# Patient Record
Sex: FEMALE | Race: WHITE | NOT HISPANIC OR LATINO | Employment: FULL TIME | URBAN - METROPOLITAN AREA
[De-identification: names, ages, dates, MRNs, and addresses within clinical notes are randomized per-mention and may not be internally consistent; named-entity substitution may affect disease eponyms.]

---

## 2023-04-11 ENCOUNTER — TELEPHONE (OUTPATIENT)
Dept: OBSTETRICS AND GYNECOLOGY | Facility: CLINIC | Age: 38
End: 2023-04-11
Payer: COMMERCIAL

## 2024-02-14 ENCOUNTER — OFFICE VISIT (OUTPATIENT)
Dept: OBSTETRICS AND GYNECOLOGY | Facility: CLINIC | Age: 39
End: 2024-02-14
Payer: COMMERCIAL

## 2024-02-14 VITALS
HEIGHT: 63 IN | WEIGHT: 143 LBS | DIASTOLIC BLOOD PRESSURE: 99 MMHG | SYSTOLIC BLOOD PRESSURE: 154 MMHG | HEART RATE: 88 BPM | BODY MASS INDEX: 25.34 KG/M2

## 2024-02-14 DIAGNOSIS — Z00.00 ENCOUNTER FOR WELLNESS EXAMINATION: ICD-10-CM

## 2024-02-14 DIAGNOSIS — Z01.419 ENCOUNTER FOR WELL WOMAN EXAM: ICD-10-CM

## 2024-02-14 DIAGNOSIS — Z80.3 FAMILY HISTORY OF BREAST CANCER: ICD-10-CM

## 2024-02-14 DIAGNOSIS — Z12.39 ENCOUNTER FOR SCREENING FOR MALIGNANT NEOPLASM OF BREAST, UNSPECIFIED SCREENING MODALITY: Primary | ICD-10-CM

## 2024-02-14 DIAGNOSIS — N39.3 SUI (STRESS URINARY INCONTINENCE, FEMALE): ICD-10-CM

## 2024-02-14 PROCEDURE — 99459 PELVIC EXAMINATION: CPT | Mod: S$GLB,,, | Performed by: OBSTETRICS & GYNECOLOGY

## 2024-02-14 PROCEDURE — 3008F BODY MASS INDEX DOCD: CPT | Mod: CPTII,S$GLB,, | Performed by: OBSTETRICS & GYNECOLOGY

## 2024-02-14 PROCEDURE — 99386 PREV VISIT NEW AGE 40-64: CPT | Mod: S$GLB,,, | Performed by: OBSTETRICS & GYNECOLOGY

## 2024-02-14 PROCEDURE — 1159F MED LIST DOCD IN RCRD: CPT | Mod: CPTII,S$GLB,, | Performed by: OBSTETRICS & GYNECOLOGY

## 2024-02-14 PROCEDURE — 3080F DIAST BP >= 90 MM HG: CPT | Mod: CPTII,S$GLB,, | Performed by: OBSTETRICS & GYNECOLOGY

## 2024-02-14 PROCEDURE — 3077F SYST BP >= 140 MM HG: CPT | Mod: CPTII,S$GLB,, | Performed by: OBSTETRICS & GYNECOLOGY

## 2024-02-14 RX ORDER — RIZATRIPTAN BENZOATE 10 MG/1
TABLET ORAL
COMMUNITY
Start: 2024-01-27

## 2024-02-14 RX ORDER — MELOXICAM 15 MG/1
15 TABLET ORAL DAILY
COMMUNITY

## 2024-02-14 RX ORDER — BUPROPION HYDROCHLORIDE 300 MG/1
300 TABLET ORAL
COMMUNITY
Start: 2024-01-26

## 2024-02-14 NOTE — PROGRESS NOTES
Subjective:      Patient ID: Brian Flores is a 38 y.o. female.    Chief Complaint:  Well Woman and Bladder Incontinence (Pt reports leakage all the time. She is needing to wear a pad daily.)      History of Present Illness  Gynecologic Exam  The patient's pertinent negatives include no genital itching, genital lesions, genital odor, genital rash, missed menses, pelvic pain, vaginal bleeding or vaginal discharge. She is not pregnant. Associated symptoms include headaches. Pertinent negatives include no abdominal pain, anorexia, back pain, chills, constipation, diarrhea, discolored urine, dysuria, fever, flank pain, hematuria, nausea, painful intercourse, rash, urgency or vomiting. The vaginal discharge was normal. The vaginal bleeding is typical of menses. She has not been passing clots. She has not been passing tissue. Nothing aggravates the symptoms. She has tried acetaminophen and NSAIDs for the symptoms. She is sexually active. No, her partner does not have an STD. She uses an IUD for contraception. Her menstrual history has been regular. There is no history of an abdominal surgery, a  section, an ectopic pregnancy, endometriosis, a gynecological surgery, herpes simplex, menorrhagia, metrorrhagia, miscarriage, ovarian cysts, perineal abscess, PID, an STD, a terminated pregnancy or vaginosis.     This is a 38 year old female coming in for her annual exam. She also has complaints of leakage of urine with coughing, sneezing running jumping and laughing.  That when she gets up from the bathroom she continues to have urine leaking out.  She denies any urgency symptoms.    GYN & OB History  Patient's last menstrual period was 2024 (exact date).   Date of Last Pap: No result found    OB History    Para Term  AB Living   3 3 3     3   SAB IAB Ectopic Multiple Live Births           3      # Outcome Date GA Lbr Milton/2nd Weight Sex Delivery Anes PTL Lv   3 Term      Vag-Spont   KEISHA   2 Term  2006     Vag-Spont   KEISHA   1 Term 2000     Vag-Spont   KEISHA       Review of Systems  Review of Systems   Constitutional:  Negative for chills, fatigue, fever and unexpected weight change.   Respiratory:  Negative for cough and shortness of breath.    Cardiovascular:  Negative for chest pain, palpitations and leg swelling.   Gastrointestinal:  Negative for abdominal pain, anorexia, bloating, blood in stool, constipation, diarrhea, nausea and vomiting.   Genitourinary:  Positive for bladder incontinence. Negative for decreased libido, dysmenorrhea, dyspareunia, dysuria, flank pain, genital sores, hematuria, menorrhagia, menstrual problem, missed menses, pelvic pain, urgency, vaginal discharge, urinary incontinence and vaginal odor.   Musculoskeletal:  Negative for arthralgias, back pain and joint swelling.   Integumentary:  Negative for rash, mole/lesion, breast mass, nipple discharge, breast skin changes and breast tenderness.   Neurological:  Positive for headaches.   Psychiatric/Behavioral: Negative.     Breast: Negative for asymmetry, lump, mass, nipple discharge, skin changes and tenderness         Objective:     Physical Exam:   Constitutional: She appears well-developed and well-nourished.      Neck: No thyroid mass and no thyromegaly present.     Pulmonary/Chest: Chest wall is not dull to percussion. She exhibits no mass, no tenderness, no bony tenderness, no laceration, no crepitus, no edema, no deformity, no swelling and no retraction. Right breast exhibits no inverted nipple, no mass, no nipple discharge, no skin change, no tenderness, presence, no bleeding and no swelling. Left breast exhibits no inverted nipple, no mass, no nipple discharge, no skin change, no tenderness, presence, no bleeding and no swelling.        Abdominal: Soft. Bowel sounds are normal. There is no abdominal tenderness. Hernia confirmed negative in the right inguinal area and confirmed negative in the left inguinal area.      Genitourinary:    Vagina and uterus normal.      Pelvic exam was performed with patient supine.     Labial bartholins normal.There is no rash, tenderness, lesion or injury on the right labia. There is no rash, tenderness, lesion or injury on the left labia. Cervix is normal. Right adnexum displays no mass, no tenderness and no fullness. Left adnexum displays no mass, no tenderness and no fullness. No rectocele, cystocele or prolapse of vaginal walls in the vagina.    Genitourinary Comments: Urethral hypermobility present                  Skin: Skin is warm and dry.    Psychiatric: She has a normal mood and affect. Her speech is normal and behavior is normal.    Chaperone present        Assessment:     1. Encounter for screening for malignant neoplasm of breast, unspecified screening modality    2. Family history of breast cancer    3. Encounter for well woman exam    4. Encounter for wellness examination    5. BRETT (stress urinary incontinence, female)              Plan:     Encounter for screening for malignant neoplasm of breast, unspecified screening modality  -     Mammo Digital Screening Bilat w/ Álvaro; Future; Expected date: 02/14/2024    Family history of breast cancer  -     Mammo Digital Screening Bilat w/ Álvaro; Future; Expected date: 02/14/2024    Encounter for well woman exam  -     Liquid-based pap smear, screening    Encounter for wellness examination    BRETT (stress urinary incontinence, female)  -     Ambulatory referral/consult to Urology; Future; Expected date: 02/21/2024

## 2024-02-22 ENCOUNTER — OFFICE VISIT (OUTPATIENT)
Dept: UROLOGY | Facility: CLINIC | Age: 39
End: 2024-02-22
Payer: COMMERCIAL

## 2024-02-22 VITALS
HEART RATE: 92 BPM | SYSTOLIC BLOOD PRESSURE: 161 MMHG | BODY MASS INDEX: 25.34 KG/M2 | HEIGHT: 63 IN | DIASTOLIC BLOOD PRESSURE: 104 MMHG | WEIGHT: 143 LBS

## 2024-02-22 DIAGNOSIS — N39.3 SUI (STRESS URINARY INCONTINENCE, FEMALE): Primary | ICD-10-CM

## 2024-02-22 PROCEDURE — 1160F RVW MEDS BY RX/DR IN RCRD: CPT | Mod: CPTII,S$GLB,, | Performed by: UROLOGY

## 2024-02-22 PROCEDURE — 3080F DIAST BP >= 90 MM HG: CPT | Mod: CPTII,S$GLB,, | Performed by: UROLOGY

## 2024-02-22 PROCEDURE — 3077F SYST BP >= 140 MM HG: CPT | Mod: CPTII,S$GLB,, | Performed by: UROLOGY

## 2024-02-22 PROCEDURE — 3008F BODY MASS INDEX DOCD: CPT | Mod: CPTII,S$GLB,, | Performed by: UROLOGY

## 2024-02-22 PROCEDURE — 1159F MED LIST DOCD IN RCRD: CPT | Mod: CPTII,S$GLB,, | Performed by: UROLOGY

## 2024-02-22 PROCEDURE — 99204 OFFICE O/P NEW MOD 45 MIN: CPT | Mod: S$GLB,,, | Performed by: UROLOGY

## 2024-02-22 NOTE — PROGRESS NOTES
Subjective:       Patient ID: Brian Flores is a 38 y.o. female.    Chief Complaint: Urinary Incontinence      HPI: 38-year-old female patient presenting to the clinic to establish care for stress incontinence.  Patient has to wear 1 panty liner per day.  She experiences urine leakage with coughing, straining, laughing.  She denies any urgency.  Patient was prescribed an anticholinergic for incontinence in the past but stopped due to side effects.  No other issues at this time         Past Medical History:   Past Medical History:   Diagnosis Date    Anxiety     Arthritis     Low grade squamous intraepith lesion on cytologic smear cervix (lgsil) 06/2006    Dr. Flores    Migraine     Severe dysplasia of cervix (ALFREDITO III) 10/2006    Dr. Flores - [facility]       Past Surgical Historical:   Past Surgical History:   Procedure Laterality Date    AUGMENTATION OF BREAST  04/2023    CERVICAL BIOPSY  W/ LOOP ELECTRODE EXCISION  10/2006    ALFREDITO 3    Essure Coils  2013    Dr. Flores    SCOLIOSIS REPAIR  2000    Ghotra Rods        Medications:   Medication List with Changes/Refills   Current Medications    BUPROPION (WELLBUTRIN XL) 300 MG 24 HR TABLET    Take 300 mg by mouth.    MELOXICAM (MOBIC) 15 MG TABLET    Take 15 mg by mouth once daily.    RIZATRIPTAN (MAXALT) 10 MG TABLET    TAKE 1 TABLET BY MOUTH EVERY DAY AS DIRECTED FOR MIGRAINE HEADACHE. MAY REPEAT IN 2 HOURS AS NEEDED. DO NOT EXCEED 3 TABLET IN 24 HOURS        Past Social History:   Social History     Socioeconomic History    Marital status:    Tobacco Use    Smoking status: Never    Smokeless tobacco: Never   Substance and Sexual Activity    Alcohol use: Yes     Comment: Occasion - Rarely    Drug use: Never    Sexual activity: Yes     Partners: Male     Birth control/protection: See Surgical Hx     Comment: Essure       Allergies: Review of patient's allergies indicates:  No Known Allergies     Family History:   Family History   Problem Relation Age of  Onset    Breast cancer Paternal Grandmother 75    Colon cancer Neg Hx     Ovarian cancer Neg Hx     Uterine cancer Neg Hx     Pancreatic cancer Neg Hx     Prostate cancer Neg Hx     Melanoma Neg Hx         Review of Systems:  Review of Systems   Constitutional:  Negative for activity change, appetite change, chills, diaphoresis, fatigue, fever and unexpected weight change.   HENT:  Negative for congestion, dental problem, drooling, ear discharge, ear pain, facial swelling, hearing loss, mouth sores, nosebleeds, postnasal drip, rhinorrhea, sinus pressure, sinus pain, sneezing, sore throat, tinnitus, trouble swallowing and voice change.    Eyes:  Negative for photophobia, pain, discharge, redness, itching and visual disturbance.   Respiratory:  Negative for apnea, cough, choking, chest tightness, shortness of breath, wheezing and stridor.    Cardiovascular:  Negative for chest pain and leg swelling.   Gastrointestinal:  Negative for abdominal distention, abdominal pain, anal bleeding, blood in stool, constipation, diarrhea, nausea, rectal pain and vomiting.   Endocrine: Negative for cold intolerance, heat intolerance, polydipsia, polyphagia and polyuria.   Genitourinary:  Positive for frequency and urgency. Negative for decreased urine volume, difficulty urinating, dyspareunia, dysuria, enuresis, flank pain, genital sores, hematuria, menstrual problem, pelvic pain, vaginal bleeding, vaginal discharge and vaginal pain.   Musculoskeletal:  Negative for arthralgias, back pain, gait problem, joint swelling, myalgias, neck pain and neck stiffness.   Skin:  Negative for color change, pallor, rash and wound.   Allergic/Immunologic: Negative for environmental allergies, food allergies and immunocompromised state.   Neurological:  Negative for dizziness, tremors, seizures, syncope, facial asymmetry, speech difficulty, weakness, light-headedness, numbness and headaches.   Hematological:  Negative for adenopathy. Does not  bruise/bleed easily.   Psychiatric/Behavioral:  Negative for agitation, behavioral problems, confusion, decreased concentration, dysphoric mood, hallucinations, self-injury, sleep disturbance and suicidal ideas. The patient is not nervous/anxious and is not hyperactive.        Physical Exam:  Physical Exam  Exam conducted with a chaperone present.   Constitutional:       Appearance: Normal appearance.   HENT:      Head: Normocephalic.      Nose: Nose normal.      Mouth/Throat:      Mouth: Mucous membranes are moist.      Pharynx: Oropharynx is clear.   Eyes:      Extraocular Movements: Extraocular movements intact.      Conjunctiva/sclera: Conjunctivae normal.      Pupils: Pupils are equal, round, and reactive to light.   Cardiovascular:      Rate and Rhythm: Normal rate and regular rhythm.      Pulses: Normal pulses.      Heart sounds: Normal heart sounds.   Pulmonary:      Effort: Pulmonary effort is normal.      Breath sounds: Normal breath sounds.   Abdominal:      General: Abdomen is flat. Bowel sounds are normal.      Palpations: Abdomen is soft.      Hernia: There is no hernia in the left inguinal area or right inguinal area.   Genitourinary:     General: Normal vulva.      Pubic Area: No rash or pubic lice.       Labia:         Right: No rash, tenderness, lesion or injury.         Left: No rash, tenderness, lesion or injury.       Urethra: No prolapse, urethral pain, urethral swelling or urethral lesion.      Rectum: Normal.   Musculoskeletal:         General: Normal range of motion.      Cervical back: Normal range of motion and neck supple.   Lymphadenopathy:      Lower Body: No right inguinal adenopathy. No left inguinal adenopathy.   Skin:     General: Skin is warm and dry.      Capillary Refill: Capillary refill takes less than 2 seconds.   Neurological:      General: No focal deficit present.      Mental Status: She is alert and oriented to person, place, and time. Mental status is at baseline.    Psychiatric:         Mood and Affect: Mood normal.         Behavior: Behavior normal.         Thought Content: Thought content normal.         Judgment: Judgment normal.         Assessment/Plan:     Stress incontinence:  We will set the patient up for pelvic floor therapy.  Decrease fluid intake and practice timed voiding.  Follow up upon completion of pelvic floor therapy for further evaluation or sooner if necessary.  PVR 3 ml    I, Dr. Alf Padilla have seen and personally evaluated the patient. I have formulated the plan reviewed all pertinent imaging and clinical data.  I agree with the nurse practitioner's assessment, and I have personally formulated the plan for this patient's care as described by the midlevel.  Problem List Items Addressed This Visit    None  Visit Diagnoses       BRETT (stress urinary incontinence, female)

## 2024-02-26 LAB — Lab: NORMAL

## 2024-02-27 ENCOUNTER — PATIENT MESSAGE (OUTPATIENT)
Dept: OBSTETRICS AND GYNECOLOGY | Facility: CLINIC | Age: 39
End: 2024-02-27
Payer: COMMERCIAL

## 2025-06-23 ENCOUNTER — OFFICE VISIT (OUTPATIENT)
Dept: OBSTETRICS AND GYNECOLOGY | Facility: CLINIC | Age: 40
End: 2025-06-23
Payer: COMMERCIAL

## 2025-06-23 VITALS
WEIGHT: 161.63 LBS | DIASTOLIC BLOOD PRESSURE: 99 MMHG | HEART RATE: 79 BPM | BODY MASS INDEX: 28.64 KG/M2 | SYSTOLIC BLOOD PRESSURE: 148 MMHG | HEIGHT: 63 IN

## 2025-06-23 DIAGNOSIS — Z12.31 SCREENING MAMMOGRAM FOR BREAST CANCER: ICD-10-CM

## 2025-06-23 DIAGNOSIS — Z00.00 ENCOUNTER FOR WELLNESS EXAMINATION: ICD-10-CM

## 2025-06-23 DIAGNOSIS — N39.3 SUI (STRESS URINARY INCONTINENCE, FEMALE): ICD-10-CM

## 2025-06-23 DIAGNOSIS — R87.610 ASCUS OF CERVIX WITH NEGATIVE HIGH RISK HPV: Primary | ICD-10-CM

## 2025-06-23 PROCEDURE — 3077F SYST BP >= 140 MM HG: CPT | Mod: CPTII,,, | Performed by: OBSTETRICS & GYNECOLOGY

## 2025-06-23 PROCEDURE — 99396 PREV VISIT EST AGE 40-64: CPT | Mod: S$PBB,,, | Performed by: OBSTETRICS & GYNECOLOGY

## 2025-06-23 PROCEDURE — 3008F BODY MASS INDEX DOCD: CPT | Mod: CPTII,,, | Performed by: OBSTETRICS & GYNECOLOGY

## 2025-06-23 PROCEDURE — 1159F MED LIST DOCD IN RCRD: CPT | Mod: CPTII,,, | Performed by: OBSTETRICS & GYNECOLOGY

## 2025-06-23 PROCEDURE — 3080F DIAST BP >= 90 MM HG: CPT | Mod: CPTII,,, | Performed by: OBSTETRICS & GYNECOLOGY

## 2025-06-23 RX ORDER — BUSPIRONE HYDROCHLORIDE 15 MG/1
TABLET ORAL
COMMUNITY
End: 2025-06-23

## 2025-06-23 RX ORDER — MELOXICAM 15 MG/1
15 TABLET ORAL EVERY MORNING
COMMUNITY

## 2025-06-23 RX ORDER — NITROFURANTOIN 25; 75 MG/1; MG/1
CAPSULE ORAL
COMMUNITY
Start: 2025-01-06 | End: 2025-06-23

## 2025-06-23 RX ORDER — SUMATRIPTAN SUCCINATE 50 MG/1
TABLET ORAL
COMMUNITY
Start: 2024-07-31 | End: 2025-06-23

## 2025-06-23 RX ORDER — UBROGEPANT 100 MG/1
TABLET ORAL
COMMUNITY

## 2025-06-23 RX ORDER — DIAZEPAM 5 MG/1
5 TABLET ORAL
COMMUNITY
Start: 2024-10-15 | End: 2025-06-23

## 2025-06-23 RX ORDER — PHENAZOPYRIDINE HYDROCHLORIDE 200 MG/1
TABLET, FILM COATED ORAL
COMMUNITY
Start: 2025-01-06 | End: 2025-06-23

## 2025-06-23 RX ORDER — DULOXETIN HYDROCHLORIDE 20 MG/1
CAPSULE, DELAYED RELEASE ORAL
COMMUNITY
End: 2025-06-23

## 2025-06-23 NOTE — PROGRESS NOTES
Subjective     Patient ID: Brian Flores is a 40 y.o. female.    Chief Complaint:  Well Woman      History of Present Illness  HPI  This is a 40 year old female coming in for her annual exam. She also has complaints of continuing to have stress incontinence.  The patient was seen by Urology they did send her for physical therapy but it was extremely expensive she continues to have loss of urine with cough sneeze running jumping laughing and bending over in his having to wear a pad daily it is affecting her daily life.      GYN & OB History  Patient's last menstrual period was 2025 (exact date).   Date of Last Pap: 2024    OB History    Para Term  AB Living   3 3 3   3   SAB IAB Ectopic Multiple Live Births       3      # Outcome Date GA Lbr Milton/2nd Weight Sex Type Anes PTL Lv   3 Term      Vag-Spont   KEISHA   2 Term      Vag-Spont   KEISHA   1 Term      Vag-Spont   KEISHA       Review of Systems  Review of Systems   Constitutional:  Negative for fatigue, fever and unexpected weight change.   Respiratory:  Negative for cough and shortness of breath.    Cardiovascular:  Negative for chest pain, palpitations and leg swelling.   Gastrointestinal:  Negative for abdominal pain, bloating, blood in stool, constipation, diarrhea, nausea and vomiting.   Genitourinary:  Positive for bladder incontinence. Negative for decreased libido, dysmenorrhea, dyspareunia, dysuria, frequency, genital sores, hematuria, menorrhagia, menstrual problem, pelvic pain, urgency, vaginal discharge, urinary incontinence and vaginal odor.   Musculoskeletal:  Negative for arthralgias and joint swelling.   Integumentary:  Negative for rash, mole/lesion, breast mass, nipple discharge, breast skin changes and breast tenderness.   Psychiatric/Behavioral: Negative.     Breast: Negative for asymmetry, lump, mass, nipple discharge, skin changes and tenderness         Objective   Physical Exam:   Constitutional: She appears  well-developed and well-nourished.      Neck: No thyroid mass and no thyromegaly present.     Pulmonary/Chest: Chest wall is not dull to percussion. She exhibits no mass, no tenderness, no bony tenderness, no laceration, no crepitus, no edema, no deformity, no swelling and no retraction. Right breast exhibits no inverted nipple, no mass, no nipple discharge, no skin change, no tenderness, presence, no bleeding and no swelling. Left breast exhibits no inverted nipple, no mass, no nipple discharge, no skin change, no tenderness, presence, no bleeding and no swelling.        Abdominal: Soft. Bowel sounds are normal. There is no abdominal tenderness. Hernia confirmed negative in the right inguinal area and confirmed negative in the left inguinal area.     Genitourinary:    Vagina and uterus normal.      Pelvic exam was performed with patient supine.     Labial bartholins normal.There is no rash, tenderness, lesion or injury on the right labia. There is no rash, tenderness, lesion or injury on the left labia. Cervix is normal. Right adnexum displays no mass, no tenderness and no fullness. Left adnexum displays no mass, no tenderness and no fullness. No rectocele, cystocele or prolapse of vaginal walls in the vagina.    Genitourinary Comments: Hypermobile urethra                  Skin: Skin is warm and dry.    Psychiatric: She has a normal mood and affect. Her speech is normal and behavior is normal.    Chaperone present           Assessment and Plan     1. ASCUS of cervix with negative high risk HPV    2. Screening mammogram for breast cancer    3. Encounter for wellness examination    4. BRETT (stress urinary incontinence, female)            Plan:  ASCUS of cervix with negative high risk HPV  -     Liquid-based pap smear, screening    Screening mammogram for breast cancer  -     Mammo Digital Screening Bilat w/ Álvaro ([facility]); Future; Expected date: 06/23/2025    Encounter for wellness examination    BRETT (stress urinary  incontinence, female)  -     Ambulatory referral/consult to Urogynecology; Future; Expected date: 06/30/2025

## 2025-07-01 ENCOUNTER — TELEPHONE (OUTPATIENT)
Dept: UROGYNECOLOGY | Facility: CLINIC | Age: 40
End: 2025-07-01
Payer: COMMERCIAL

## 2025-07-01 ENCOUNTER — RESULTS FOLLOW-UP (OUTPATIENT)
Dept: OBSTETRICS AND GYNECOLOGY | Facility: CLINIC | Age: 40
End: 2025-07-01

## 2025-07-01 ENCOUNTER — PATIENT MESSAGE (OUTPATIENT)
Dept: OBSTETRICS AND GYNECOLOGY | Facility: CLINIC | Age: 40
End: 2025-07-01
Payer: COMMERCIAL

## 2025-07-02 DIAGNOSIS — R87.619 ATYPICAL GLANDULAR CELLS ON CERVICAL PAP SMEAR: Primary | ICD-10-CM

## 2025-07-02 RX ORDER — OXYCODONE AND ACETAMINOPHEN 5; 325 MG/1; MG/1
2 TABLET ORAL SEE ADMIN INSTRUCTIONS
Qty: 2 TABLET | Refills: 0 | Status: SHIPPED | OUTPATIENT
Start: 2025-07-02

## 2025-07-02 RX ORDER — MISOPROSTOL 200 UG/1
TABLET ORAL
Qty: 2 TABLET | Refills: 0 | Status: SHIPPED | OUTPATIENT
Start: 2025-07-02

## 2025-07-11 ENCOUNTER — PATIENT MESSAGE (OUTPATIENT)
Dept: UROGYNECOLOGY | Facility: CLINIC | Age: 40
End: 2025-07-11

## 2025-07-11 ENCOUNTER — OFFICE VISIT (OUTPATIENT)
Dept: UROGYNECOLOGY | Facility: CLINIC | Age: 40
End: 2025-07-11
Payer: COMMERCIAL

## 2025-07-11 ENCOUNTER — PROCEDURE VISIT (OUTPATIENT)
Dept: UROGYNECOLOGY | Facility: CLINIC | Age: 40
End: 2025-07-11
Payer: COMMERCIAL

## 2025-07-11 VITALS
DIASTOLIC BLOOD PRESSURE: 95 MMHG | BODY MASS INDEX: 27.12 KG/M2 | WEIGHT: 153.13 LBS | SYSTOLIC BLOOD PRESSURE: 138 MMHG

## 2025-07-11 DIAGNOSIS — R87.619 ABNORMAL CERVICAL PAPANICOLAOU SMEAR, UNSPECIFIED ABNORMAL PAP FINDING: ICD-10-CM

## 2025-07-11 DIAGNOSIS — N39.3 URINARY, INCONTINENCE, STRESS FEMALE: ICD-10-CM

## 2025-07-11 DIAGNOSIS — R35.0 URINATION FREQUENCY: ICD-10-CM

## 2025-07-11 DIAGNOSIS — N95.2 VAGINAL ATROPHY: ICD-10-CM

## 2025-07-11 DIAGNOSIS — N39.3 URINARY, INCONTINENCE, STRESS FEMALE: Primary | ICD-10-CM

## 2025-07-11 DIAGNOSIS — R39.15 URINARY URGENCY: ICD-10-CM

## 2025-07-11 DIAGNOSIS — K59.09 CHRONIC CONSTIPATION: ICD-10-CM

## 2025-07-11 DIAGNOSIS — Z29.9 PROPHYLACTIC MEASURE: Primary | ICD-10-CM

## 2025-07-11 LAB
BILIRUB SERPL-MCNC: NORMAL MG/DL
BLOOD URINE, POC: NORMAL
CLARITY, POC UA: CLEAR
COLOR, POC UA: NORMAL
GLUCOSE UR QL STRIP: NORMAL
KETONES UR QL STRIP: NORMAL
LEUKOCYTE ESTERASE URINE, POC: NORMAL
NITRITE, POC UA: NORMAL
PH, POC UA: 5
PROTEIN, POC: NORMAL
SPECIFIC GRAVITY, POC UA: 1
UROBILINOGEN, POC UA: NORMAL

## 2025-07-11 PROCEDURE — 87086 URINE CULTURE/COLONY COUNT: CPT | Performed by: OBSTETRICS & GYNECOLOGY

## 2025-07-11 RX ORDER — LIDOCAINE HYDROCHLORIDE 20 MG/ML
JELLY TOPICAL
Status: COMPLETED | OUTPATIENT
Start: 2025-07-11 | End: 2025-07-11

## 2025-07-11 RX ORDER — ESTRADIOL 0.1 MG/G
CREAM VAGINAL
Qty: 42.5 G | Refills: 11 | Status: SHIPPED | OUTPATIENT
Start: 2025-07-11

## 2025-07-11 RX ADMIN — LIDOCAINE HYDROCHLORIDE: 20 JELLY TOPICAL at 01:07

## 2025-07-11 NOTE — PROGRESS NOTES
[facility]  [facility], [address]  [facility] [address]    Brian Sandra  [id number]  1985    Consulting Physician: No ref. provider found   GYN: Sintia Phipps MD  Primary M.D.: No, Primary Doctor    Chief Complaint   Patient presents with    Urinary Incontinence       HPI:     1)  UI:  (+) BRETT x years, worsening,  (--) UUI.  (+) pads:2/day, usually variable wetness depending on activity. Daytime frequency: Q 1 hours.  Nocturia: Yes: 2/night.   (--) dysuria,  (--) hematuria,  (--) frequent UTIs.  (+) complete bladder emptying. Some PV urgency. +PV dribbling     2)  POP:  Absent.  (--) vaginal bleeding. (--) vaginal discharge. (+) sexually active.  (--) dyspareunia.  (+)  Vaginal dryness.  Lube with intercourse.  (--) vaginal estrogen use.     3)  BM:  (+) constipation/straining. OTC laxatives PRN.  +hemorrhoids.  (--) chronic diarrhea. (--) hematochezia.  (--) fecal incontinence.  (--) fecal smearing/urgency.  (--) rectal prolapse.  (+) complete evacuation.     Past Medical History  Past Medical History:   Diagnosis Date    Anxiety     Arthritis     Low grade squamous intraepith lesion on cytologic smear cervix (lgsil) 2006    Dr. Flores    Migraine     Severe dysplasia of cervix (ALFREDITO III) 10/2006    Dr. Flores - LEEP   --chronic back pain: scoliosis; takes meloxicam  --fibromyalgia: figuring out meds; cymbalta caused diarrhea    Past Surgical History  Past Surgical History:   Procedure Laterality Date    AUGMENTATION OF BREAST  2023    CERVICAL BIOPSY  W/ LOOP ELECTRODE EXCISION  10/2006    ALFREDITO 3    Essure Coils      Dr. Flores    SCOLIOSIS REPAIR  2000    Ghotra Rods      Hysterectomy: No    Past Ob History     x 3.  C/s x 0.    Largest infant weight: 7#12oz.  yes FAVD. yes episiotomy.      Gynecologic History  LMP: Patient's last menstrual period was 2025 (exact date).  Age of menarche: 8 yo  Age of menopause:  NA  Menstrual history: normal, monthly cycles  Pap test: 6/2025 AGC/HPV NEG. Has colpo/EMBx scheduled for 7-.  History of abnormal paps: Yes - s/p LEEP.  History of STIs:  No  Mammogram: Date of last: 37 yo.  Result: Normal per report.  Colonoscopy: none  DEXA:  none    Family History  Family History   Problem Relation Name Age of Onset    Breast cancer Paternal Grandmother  75    Colon cancer Neg Hx      Ovarian cancer Neg Hx      Uterine cancer Neg Hx      Pancreatic cancer Neg Hx      Prostate cancer Neg Hx      Melanoma Neg Hx        Colon CA: No  Breast CA: Yes - PGM  GYN CA: No   CA: No    Social History  Tobacco Use History[1]  Social History     Substance and Sexual Activity   Alcohol Use Yes    Comment: Occasion - Rarely   .    Social History     Substance and Sexual Activity   Drug Use Never   .  The patient is .  Resides in [place] [address].  Employment status: currently employed as 8th [employer] .     Allergies  Review of patient's allergies indicates:  No Known Allergies    Medications  Medications Ordered Prior to Encounter[2]    Review of Systems A 14 point ROS was reviewed with pertinent positives as noted above in the history of present illness.      Constitutional: negative  Eyes: negative  Endocrine: negative  Gastrointestinal: negative  Cardiovascular: negative  Respiratory: negative  Allergic/Immunologic: negative  Integumentary: negative  Psychiatric: negative  Musculoskeletal: negative   Ear/Nose/Throat: negative  Neurologic: negative  Genitourinary: SEE HPI  Hematologic/Lymphatic: negative   Breast: negative    Urogynecologic Exam  BP (!) 138/95   Wt 69.4 kg (153 lb 1.8 oz)   LMP 06/12/2025 (Exact Date)   BMI 27.12 kg/m²     GENERAL APPEARANCE:  The patient is well-developed, well-nourished.   Neck:  Supple with no thyromegaly, no carotid bruits.  Heart:  Regular rate and rhythm, no murmurs, rubs or gallops.  Lungs:  Clear.  No CVA tenderness.  Abdomen:  Soft,  nontender, nondistended, no hepatosplenomegaly.  Incisions:  none    PELVIC:    External genitalia:  Normal Bartholins, Skenes and labia bilaterally.    Urethra:  No caruncle, diverticulum or masses.  (+) hypermobility.    Vagina:  Atrophy (+) , no bladder masses or tender, no discharge.    Cervix:  normal appearance  Uterus: normal size, contour, position, consistency, mobility, non-tender  Adnexa: Not palpable.    POP-Q:   Deferred.  No obvious POP present with valsalva.     NEUROLOGIC:  Cranial nerves 2 through 12 intact.  Strength 5/5.  DTRs 2+ lower extremities.  S2 through 4 normal.  Sacral reflexes intact.    EXT: ELLIOTT, 2+ pulses bilaterally, no C/C/E    COUGH STRESS TEST:  negative  KEGEL: 2 /5    RECTAL:    External:  Normal, (--) hemorrhoids, (--) dovetailing.   Internal:   deferred    PVR: 20 mL    PROCEDURE:  Surgeons Narrative:     Informed consent was obtained.      Pessary placed: Yes     The patient was allowed to void in hat on toilet, and emptied well.      The patient was then placed in the lithotomy position. The urethral meatus was prepped with Betadine, and a 16F red rubber catheter was used to drain the bladder, with 20 mL produced.      The patient's bladder was then filled to gravity through the red rubber catheter, with the following noted:  --1st desire: 50 mL  --Normal desire:  100 mL  --Strong desire:  150 mL  --Urgency:  200 mL  --Detrusor contraction: No     The catheter was then removed.   The patient was asked to cough with (+) LEAK.     The patient was asked to valsalva with (--) LEAK.       She was again allowed to void in a hat on the toilet, with adequate emptying.      Next, cystourethroscopy was performed. The urethra was prepped with betadine, and 10 mL of 2% lidocaine gel were instilled into the bladder through the urethra.  A flexible cystourethroscope was introduced into the bladder. The bladder was distended with approximately 300 cubic centimeters of sterile water. A  systematic survey was performed in which the bladder was surveyed using multiple sequential passes in a clockwise fashion from the bladder dome to the bladder base to the urethrovesical junction. The trigone and ureteral orifices were observed. The scope was then flipped back on itself, and the urethrovesical junction was viewed. A vaginal examining finger was then placed with pressure suburethrally at the urethrovesical junction as the telescope was withdrawn in order to perform positive pressure urethroscopy.  Standard maneuvers of cough, squeeze and Valsalva were performed. The telescope was then completely withdrawn.     Findings:   Simple cystometry:  --1st desire: 50 mL  --Normal desire:  100 mL  --Strong desire:  150 mL  --Urgency:  200 mL  --Detrusor contraction: No  --Cough stress test:   Cough: (+)  Valsalva: (--)   --PVR:  20 mL      Cystourethroscopy:  Urethroscopy:  Normal.  Cystoscopy:  Normal bladder mucosa, bilateral ureteral flow was noted.    Assessment: Essentially Normal cystourethroscopy.    Impression    1. Urinary, incontinence, stress female    2. Vaginal atrophy    3. Chronic constipation    4. Abnormal cervical Papanicolaou smear, unspecified abnormal pap finding      Initial Plan  The patient was counseled regarding these issues. The patient was given a summary sheet containing each of these issues with possible options for evaluation and management. When appropriate, we also reviewed computer-generated diagrams specific to their diagnoses..  All questions were addressed to the patient's satisfaction.    1)  Female stress incontinence:  --urine C&S  --Empty bladder every 3 hours.  Empty well: wait a minute, lean forward on toilet.    --Avoid dietary irritants (see sheet).  Keep diary x 3-5 days to determine your irritants.  --KEGELS: do 10 in AM and 10 in PM, holding each x 10 seconds.  When you feel urge to go, STOP, KEGEL, and when urge has passed, then go to bathroom.  Consider PT in  future.     --STRESS:  Pessary vs. Sling vs bulking.     https://www.yourpelvicfloor.org/media/stress-urinary-incontinence-[email or web address].pdf    https://www.augs.org/assets/1/6/AUGS-SUFU_MUS_Position_Statement.pdf    --try PT while waiting; call to make appt:  [place]  Address: [address] [place] [address], [place] [address]  Phone: [phone]  Email: [email or web address]  (P) [phone]  (W) [phone]  **takes (Medicaid    2)  Vaginal atrophy (dryness):    --start vaginal estrogen:  --Use 0.5 grams of estrogen cream in vagina with applicator or dime-sized amount with finger (as far as can reach internally) nightly x 2 weeks, then twice a week thereafter.  You can also apply a dime-sized amount with your finger around the vaginal opening and inner lips at same frequency.     --Vaginal estrogen may help to decrease pain related to dryness with intercourse and urinary symptoms (urgency/frequency/UTIs) around menopause.     --for intercourse, can use Non-estrogen options for vaginal dryness:  --coconut oil: dime-sized amount with finger (as far as can reach internally) and around the vaginal opening and inner lips up to nightly as needed  --Uberlube, Astroglide, [place] liquibeads, Satin by Sliquid Natural Intimate Moisturizer    3)  Constipation:  --hydrate well  Controlling constipation may help bladder urgency/leakage and fiber may better control cholesterol and blood glucose.  Start daily fiber.  Take 1 tsp of fiber powder (psyllium or other sugar-free powder).  Mix in 8 oz of water.  Take x 3-5 days.  Then, increase fiber by 1 tsp every 3-5 days until stool is easy to pass.  Stop and continue at that dose.   Do not exceed 6 tsps/day.  May also use over the counter stool softener 1-2 x/day.  AVOID laxatives.    4)  Please talk with Dr. Phipps about scheduling mammogram.     5)  Abnormal pap smear:  --follow up testing with Dr. Phipps    6)  Start PT.  Finish workup for abnormal pap smear.  Let us know if would like to  move forward with other intervention (sling or bulking) for stress incontinence.     Thank you for requesting consultation of your patient.  I look forward to participating in their care.    I spent a total of 60 minutes on the day of the visit. This includes face to face time and non-face to face time preparing to see the patient (eg, review of tests), obtaining and/or reviewing separately obtained history, documenting clinical information in the electronic or other health record, independently interpreting results and communicating results to the patient/family/caregiver, or care coordinator. This time is separate and distinct from the procedure(s) performed.      Edison Early  Female Pelvic Medicine and Reconstructive Surgery  [facility] [place], [place]           [1]   Social History  Tobacco Use   Smoking Status Never   Smokeless Tobacco Never   [2]   Current Outpatient Medications on File Prior to Visit   Medication Sig Dispense Refill    buPROPion (WELLBUTRIN XL) 300 MG 24 hr tablet Take 300 mg by mouth.      meloxicam (MOBIC) 15 MG tablet Take 15 mg by mouth once daily.      meloxicam (MOBIC) 15 MG tablet Take 15 mg by mouth every morning.      miSOPROStoL (CYTOTEC) 200 MCG Tab 200mcg PO hs prior to  And am of the procedure 2 tablet 0    oxyCODONE-acetaminophen (PERCOCET) 5-325 mg per tablet Take 2 tablets by mouth As instructed (30mins prior to procedure). 2 tablet 0    UBRELVY 100 mg tablet TAKE ONE TABLET BY MOUTH AT START OF MIGRAINE ONCE A DAY, MAY REPEAT IN 2 HOURS IF NEEDED       No current facility-administered medications on file prior to visit.

## 2025-07-11 NOTE — PROGRESS NOTES
Title of Operation:   Cystourethroscopy.     INDICATIONS:  Female stress incontinence  Urinary urgency/frequency    PREOPERATIVE DIAGNOSIS  Female stress incontinence  Urinary urgency/frequency    POSTOPERATIVE DIAGNOSIS:   Female stress incontinence  Urinary urgency/frequency    Anesthesia:   2% Xylocaine gel.    Specimen (Bacteriological, Pathological or other):   None.     Prosthetic Device/Implant:   None.     Surgeons Narrative:     After informed consent was obtained, the patient was placed in the lithotomy position. The urethral meatus was prepped with Betadine and 10 cubic centimeters of 2% Xylocaine gel were introduced into the urethra. A flexible cystourethroscope was introduced into the bladder. The bladder was distended with approximately 300 cubic centimeters of sterile water. A systematic survey was performed in which the bladder was surveyed using multiple sequential passes in a clockwise fashion from the bladder dome to the bladder base to the urethrovesical junction. The trigone and ureteral orifices were observed. The scope was then flipped back on itself, and the urethrovesical junction was viewed. A vaginal examining finger was then placed with pressure suburethrally at the urethrovesical junction as the telescope was withdrawn in order to perform positive pressure urethroscopy.  Standard maneuvers of cough, squeeze and Valsalva were performed. The telescope was then completely withdrawn.     Findings: Urethroscopy:  Normal.  Cystoscopy:  Normal bladder mucosa, bilateral ureteral flow was noted.      Assessment: Essentially normal cystourethroscopy.      Plan: The patient will follow up with Dr. Early as scheduled.  See clinic note from 7- for further plan details.

## 2025-07-12 LAB — BACTERIA UR CULT: NORMAL

## 2025-07-14 ENCOUNTER — TELEPHONE (OUTPATIENT)
Dept: UROGYNECOLOGY | Facility: CLINIC | Age: 40
End: 2025-07-14
Payer: COMMERCIAL

## 2025-07-14 NOTE — TELEPHONE ENCOUNTER
Spoke with pt gave negative result pt VU      ----- Message from Edison Early MD sent at 7/13/2025  7:01 PM CDT -----  Please let the patient know urine C&S was negative for infection.  Thanks!    ----- Message -----  From: Lab, Background User  Sent: 7/12/2025  12:07 PM CDT  To: Edison Early MD

## 2025-07-28 ENCOUNTER — PATIENT MESSAGE (OUTPATIENT)
Dept: OBSTETRICS AND GYNECOLOGY | Facility: CLINIC | Age: 40
End: 2025-07-28
Payer: COMMERCIAL

## 2025-07-29 ENCOUNTER — PROCEDURE VISIT (OUTPATIENT)
Dept: OBSTETRICS AND GYNECOLOGY | Facility: CLINIC | Age: 40
End: 2025-07-29
Payer: COMMERCIAL

## 2025-07-29 VITALS — HEIGHT: 63 IN | BODY MASS INDEX: 27.79 KG/M2 | WEIGHT: 156.81 LBS | HEART RATE: 80 BPM

## 2025-07-29 DIAGNOSIS — R87.619 ATYPICAL GLANDULAR CELLS OF UNDETERMINED SIGNIFICANCE (AGUS) ON CERVICAL PAP SMEAR: ICD-10-CM

## 2025-07-29 DIAGNOSIS — R87.610 ASCUS OF CERVIX WITH NEGATIVE HIGH RISK HPV: Primary | ICD-10-CM

## 2025-07-29 LAB
B-HCG UR QL: NEGATIVE
CTP QC/QA: YES

## 2025-07-29 PROCEDURE — 57456 ENDOCERV CURETTAGE W/SCOPE: CPT | Mod: S$PBB,,, | Performed by: OBSTETRICS & GYNECOLOGY

## 2025-07-29 PROCEDURE — 99499 UNLISTED E&M SERVICE: CPT | Mod: S$PBB,,, | Performed by: OBSTETRICS & GYNECOLOGY

## 2025-07-29 PROCEDURE — 58110 BX DONE W/COLPOSCOPY ADD-ON: CPT | Mod: S$PBB,,, | Performed by: OBSTETRICS & GYNECOLOGY

## 2025-07-29 NOTE — PROCEDURES
Biopsy (Gynecological)    Date/Time: 7/29/2025 2:40 PM    Performed by: Sintia Phipps MD  Authorized by: Sintia Phipps MD     Patient was prepped and draped in the normal sterile fashion.  Local anesthesia used?: No      Biopsy Location:  Uterus   Patient tolerated the procedure well with no immediate complications.

## 2025-07-29 NOTE — PROCEDURES
Colposcopy    Date/Time: 7/29/2025 2:40 PM    Performed by: Sintia Phipps MD  Authorized by: Sintia Phipps MD    Assistants?: Yes      Colposcopy Site:  Vagina and Cervix, Upper Vagina  Position:  Supine  Acrowhite Lesion: No    Atypical Vessels: No    Transformation Zone Adequate?: Yes    Biopsy?: No    ECC Performed?: Yes    LEEP Performed?: No     Patient tolerated the procedure well with no immediate complications.   Post-operative instructions were provided for the patient.   Patient was discharged and will follow up if any complications occur

## 2025-08-01 ENCOUNTER — TELEPHONE (OUTPATIENT)
Dept: OBSTETRICS AND GYNECOLOGY | Facility: CLINIC | Age: 40
End: 2025-08-01
Payer: COMMERCIAL

## 2025-08-01 NOTE — TELEPHONE ENCOUNTER
Let pt know that someone from Dr. Phipps's office will reach back out to her on Monday. She understood.   Detail Level: Zone Detail Level: Generalized Detail Level: Detailed Detail Level: Simple

## 2025-08-01 NOTE — TELEPHONE ENCOUNTER
Copied from CRM #[id number]. Topic: General Inquiry - Return Call  >> Aug 1, 2025 12:21 PM Licha wrote:  Type:  Patient Returning Call    Who Called: patient   Who Left Message for Patient:nurse   Does the patient know what this is regarding?:call back  Would the patient rather a call back or a response via [facility]? Call back   Best Call Back Number: [phone]    Additional Information:

## 2025-08-03 ENCOUNTER — PATIENT MESSAGE (OUTPATIENT)
Dept: UROGYNECOLOGY | Facility: CLINIC | Age: 40
End: 2025-08-03
Payer: COMMERCIAL

## 2025-08-04 ENCOUNTER — TELEPHONE (OUTPATIENT)
Dept: OBSTETRICS AND GYNECOLOGY | Facility: CLINIC | Age: 40
End: 2025-08-04
Payer: COMMERCIAL

## 2025-08-04 NOTE — TELEPHONE ENCOUNTER
Copied from CRM #[id number]. Topic: General Inquiry - Return Call  >> Aug 1, 2025 12:21 PM Licha wrote:  Type:  Patient Returning Call    Who Called: patient   Who Left Message for Patient:nurse   Does the patient know what this is regarding?:call back  Would the patient rather a call back or a response via [facility]? Call back   Best Call Back Number: [phone]    Additional Information:

## 2025-08-07 ENCOUNTER — TELEPHONE (OUTPATIENT)
Dept: OBSTETRICS AND GYNECOLOGY | Facility: CLINIC | Age: 40
End: 2025-08-07
Payer: COMMERCIAL

## 2025-08-07 NOTE — TELEPHONE ENCOUNTER
Copied from CRM #[id number]. Topic: General Inquiry - Return Call  >> Aug 1, 2025 12:21 PM Licha wrote:  Type:  Patient Returning Call    Who Called: patient   Who Left Message for Patient:nurse   Does the patient know what this is regarding?:call back  Would the patient rather a call back or a response via [employer]? Call back   Best Call Back Number: [phone]    Additional Information:  >> Aug 7, 2025  8:54 AM Sintia Phipps MD wrote:  Please open a taken on this so this can be taken out of my inbox it has been dealt with  ----- Message -----  From: Avelina Marie LPN  Sent: 8/4/2025  10:03 AM CDT  To: Sintia Phipps MD    Patient returning your call  ----- Message -----  From: Licha Garcia  Sent: 8/1/2025  12:22 PM CDT  To: Desire Patricio Staff    >> Aug 4, 2025 10:24 AM Sintia Phipps MD wrote:  Spoke with the patient at this time I offered her hysteroscopy D&C with an ECC versus repeating her Pap in 6 months.  The patient will repeat Pap in 6 months  >> Aug 4, 2025 10:03 AM Nurse Avelina wrote:  Patient returning your call  ----- Message -----  From: Licha Garcia  Sent: 8/1/2025  12:22 PM CDT  To: Desire Basurto

## 2025-08-07 NOTE — TELEPHONE ENCOUNTER
Copied from CRM #[id number]. Topic: General Inquiry - Return Call  >> Aug 1, 2025 12:21 PM Licha wrote:  Type:  Patient Returning Call    Who Called: patient   Who Left Message for Patient:nurse   Does the patient know what this is regarding?:call back  Would the patient rather a call back or a response via [email or web address]? Call back   Best Call Back Number: [phone]    Additional Information:  >> Aug 7, 2025  8:54 AM Sintia Phipps MD wrote:  Please open a taken on this so this can be taken out of my inbox it has been dealt with  ----- Message -----  From: Avelina Marie LPN  Sent: 8/4/2025  10:03 AM CDT  To: Sintia Phipps MD    Patient returning your call  ----- Message -----  From: Licha Garcia  Sent: 8/1/2025  12:22 PM CDT  To: Desire Patricio Staff    >> Aug 4, 2025 10:24 AM Sintia Phipps MD wrote:  Spoke with the patient at this time I offered her hysteroscopy D&C with an ECC versus repeating her Pap in 6 months.  The patient will repeat Pap in 6 months  >> Aug 4, 2025 10:03 AM Nurse Avelina wrote:  Patient returning your call  ----- Message -----  From: Licha Garcia  Sent: 8/1/2025  12:22 PM CDT  To: Desire Basurto

## 2025-08-25 PROBLEM — R39.15 URINARY URGENCY: Status: RESOLVED | Noted: 2025-07-11 | Resolved: 2025-08-25

## 2025-08-25 PROBLEM — R35.0 URINATION FREQUENCY: Status: RESOLVED | Noted: 2025-07-11 | Resolved: 2025-08-25
